# Patient Record
(demographics unavailable — no encounter records)

---

## 2024-11-25 NOTE — HISTORY OF PRESENT ILLNESS
[FreeTextEntry1] : letter for kandy naik  [de-identified] : 51yoM with a PMHx of migraines, anterior cerebral aneurysms, HLD, NUNO, seizures presents for preop clearance for abdominal skin tightening procedure. Pt reports he is going to [facility] where he will be having a procedure to reduce loose skin around his abdomen which is present after weight loss. Is unsure the exact name of procedure and does not have name of physician performing the procedure. Reports it will NOT be done under anesthesia.  Anesthesia - no history of adverse reactions in self or first-degree relatives Cardiac - no issues Pulm - no issues Mets >4

## 2024-11-25 NOTE — HEALTH RISK ASSESSMENT
[Little interest or pleasure doing things] : 1) Little interest or pleasure doing things [Feeling down, depressed, or hopeless] : 2) Feeling down, depressed, or hopeless [0] : 2) Feeling down, depressed, or hopeless: Not at all (0) [PHQ-9 Negative - No further assessment needed] : PHQ-9 Negative - No further assessment needed [Former] : Former [TNH2Sufhy] : 0

## 2024-11-25 NOTE — END OF VISIT
[] : Resident [FreeTextEntry3] : 51M w/above PMH here for letter for kandy naik, pt wants excess skin removed. Planning on procedure in January, but unsure of date, surgeon or actual procedure name.  Does not require labwork or preop paperwork. Facility requesting a letter stating pts seizures are controlled prior to booking procedure, Discussed w/Dr. Mejia, she will provide pt letter for procedure.

## 2024-12-13 NOTE — PHYSICAL EXAM
[FreeTextEntry1] : General: Constitutional:  Sitting comfortably in NAD. Psychiatric: well-groomed, appropriate affect Ears, Nose, Throat: no abnormalities, mucus membranes moist Neck: supple Extremities: no edema, clubbing or cyanosis Skin: no rash or neuro-cutaneous signs   Cognitive: Orientation, language, memory and knowledge screens intact.  Cranial Nerves: II: HERNAN. III/IV/VI: EOM Full.  VII: Face appears symmetric VIII: Normal to screening IX/X: normal phonation  XI: Trapezius Symmetric  XII: Tongue midline Motor: Power: no pronator drift  Narrow based gait

## 2024-12-13 NOTE — ASSESSMENT
[FreeTextEntry1] : Impression: 1) Drop events, resolved. Possibly syncopal, migraine syncope or anxiety in origin - rEEG and MRA/MRI: no concerns for seizures and aneurysm has been stable at 3.1mm. Possibly vasovagal vs anxiety-provoked episodes, resolved. 2) Cerebral aneurysm anterior and relatively low risk- requires surveillance & follow-up every 12-18 months. 3) Migraines - improved with Botox every three months, emgality and TPM, as well as nurtec + medical marijuana. 4) dizziness - non-positional, meclizine has helped at times.  Plan: 1) Repeat botox every 3 months, injected today. 2) F/u for repeat imaging in mid to late 2025. 3) renew meclizine 12.5mg

## 2024-12-13 NOTE — HISTORY OF PRESENT ILLNESS
[FreeTextEntry1] : Derick is a 51 yo man with small cerebral aneursym, drop events and migraines.  Drop attacks stabilized but still finding dizziness an issue.  Dizziness is non-positional, random, though may be triggered more frequently in moving vehicles.  Can last for an hour at a time with some residual.  Meclizine can be helpful. Gait feels off at times. Sometimes feel he may pass out but has not had any drops.  All symptoms worsen with migraines - often the migraine occurs the day after, but not always. Migraines: derives significant benefit for at least 2 months after botox, then some recurrence with progression through that month. Nurtec used more frequently during that time period. Continues on emgality. Also had been using medical MJ with some benefit.  He noted 2kg weight increase, but no medications have been changed.  He wondered if the TPM or CLN could be contributing, which would be unusual for either, particularly with the chronic use.

## 2024-12-13 NOTE — PROCEDURE
[Chronic migraine] : Chronic migraine [Consent Signed] : consent not signed [Adverse Effects] : no adverse effects [Continue Current Treatment] : continue current treatment [BOTOX 200 Units] : BOTOX 200 units; 5 units per muscle site.  procerus x 1, corragator x 2, frontalis x 4, temporalis x 8, occipitalis x 6, cervical  paraspinal x 4 and trapezius x 6 [FreeTextEntry1] : Informed consent obtained for use of botox.  This includes injection site pain, infection, bruising, hematoma and local allergic reaction. Allergies, including systemic reactions. Muscle weakness/eyelid or brow drooping.   Muscle atrophy. The areas to be injected were cleaned with alcohol swabs and allowed to dry prior to injection.  Botox 200u [address]-76 Lot#[id number], Exp 09/26 mixed with 4cc saline  per standard procedure indicated above with these exceptions:   frontally distributed  2.5 x 4 along mid-forehead, 3.75u x 4 upper forehead  The patient tolerated the procedure without issues.   Total Used:  160u Total Wasted:  40u  The patient tolerated the procedure without issues.